# Patient Record
Sex: MALE | Race: WHITE | NOT HISPANIC OR LATINO | Employment: FULL TIME | URBAN - METROPOLITAN AREA
[De-identification: names, ages, dates, MRNs, and addresses within clinical notes are randomized per-mention and may not be internally consistent; named-entity substitution may affect disease eponyms.]

---

## 2020-02-06 ENCOUNTER — HOSPITAL ENCOUNTER (OUTPATIENT)
Dept: OTHER | Facility: HOSPITAL | Age: 45
Discharge: HOME | End: 2020-02-06
Attending: NURSE PRACTITIONER

## 2020-03-12 ENCOUNTER — HOSPITAL ENCOUNTER (OUTPATIENT)
Dept: OTHER | Facility: HOSPITAL | Age: 45
Discharge: HOME | End: 2020-03-12
Attending: NURSE PRACTITIONER

## 2022-10-27 ENCOUNTER — TRANSCRIBE ORDERS (OUTPATIENT)
Dept: ADMINISTRATIVE | Facility: HOSPITAL | Age: 47
End: 2022-10-27

## 2022-10-27 DIAGNOSIS — G44.001 INTRACTABLE CLUSTER HEADACHE SYNDROME, UNSPECIFIED CHRONICITY PATTERN: Primary | ICD-10-CM

## 2022-11-01 ENCOUNTER — HOSPITAL ENCOUNTER (OUTPATIENT)
Dept: CT IMAGING | Facility: HOSPITAL | Age: 47
Discharge: HOME | End: 2022-11-01
Admitting: NURSE PRACTITIONER

## 2022-11-01 DIAGNOSIS — G44.001 INTRACTABLE CLUSTER HEADACHE SYNDROME, UNSPECIFIED CHRONICITY PATTERN: ICD-10-CM

## 2022-11-01 PROCEDURE — 70450 CT HEAD/BRAIN W/O DYE: CPT

## 2023-02-10 ENCOUNTER — HOSPITAL ENCOUNTER (OUTPATIENT)
Dept: GENERAL RADIOLOGY | Facility: HOSPITAL | Age: 48
Discharge: HOME | End: 2023-02-10
Admitting: PHYSICIAN ASSISTANT
Payer: COMMERCIAL

## 2023-02-10 ENCOUNTER — OFFICE VISIT (OUTPATIENT)
Dept: ORTHOPEDIC SURGERY | Facility: CLINIC | Age: 48
End: 2023-02-10
Payer: COMMERCIAL

## 2023-02-10 VITALS — HEIGHT: 72 IN | BODY MASS INDEX: 28.53 KG/M2 | WEIGHT: 210.6 LBS

## 2023-02-10 DIAGNOSIS — G89.29 CHRONIC LEFT SHOULDER PAIN: Primary | ICD-10-CM

## 2023-02-10 DIAGNOSIS — M25.512 LEFT SHOULDER PAIN, UNSPECIFIED CHRONICITY: ICD-10-CM

## 2023-02-10 DIAGNOSIS — M25.512 LEFT SHOULDER PAIN, UNSPECIFIED CHRONICITY: Primary | ICD-10-CM

## 2023-02-10 DIAGNOSIS — M25.512 CHRONIC LEFT SHOULDER PAIN: Primary | ICD-10-CM

## 2023-02-10 PROCEDURE — 73030 X-RAY EXAM OF SHOULDER: CPT

## 2023-02-10 PROCEDURE — 99203 OFFICE O/P NEW LOW 30 MIN: CPT | Performed by: PHYSICIAN ASSISTANT

## 2023-02-10 RX ORDER — ASPIRIN 81 MG/1
TABLET ORAL
COMMUNITY

## 2023-02-10 NOTE — PROGRESS NOTES
"Chief Complaint  Establish Care of the Left Shoulder    Subjective    History of Present Illness      Santos Willis is a 47 y.o. male who presents to [facility] for complaint of Shoulder Pain: Patient complains of left shoulder pain.   The onset of the pain was about 2-3 ago.    The pain is described as aching and throbbing.    His symptoms were initially described as moderate Ezra painful.  He denies any injury to the area.  He actually reports he was placed on meloxicam recently for an unrelated issue and since the beginning of this week, his shoulder pain has resolved.    He has a known diabetic with a hemoglobin A1c of 6.4%.    Objective   Vital Signs:   Ht 182.9 cm (72\")   Wt 95.5 kg (210 lb 9.6 oz)   BMI 28.56 kg/m²     Physical Exam  Vitals and nursing note reviewed.   Constitutional:       Appearance: Normal appearance.   Pulmonary:      Effort: Pulmonary effort is normal.   Skin:     General: Skin is warm and dry.      Capillary Refill: Capillary refill takes less than 2 seconds.   Neurological:      General: No focal deficit present.      Mental Status: He is alert and oriented to person, place, and time. Mental status is at baseline.   Psychiatric:         Mood and Affect: Mood normal.         Behavior: Behavior normal.         Thought Content: Thought content normal.         Judgment: Judgment normal.         Ortho Exam   LEFT shoulder  Negative for pain and tenderness with palpation over the subcromial bursa.   Negative for signs of impingement with internal and external rotation.   Forward flexion is 0- 140 degrees, abduction is 0-140 degrees, external rotation is 0-50 degrees.   Rotator cuff function is well preserved.   Apprehension sign is negative.   Axillary nerve function is well preserved. Radial artery pulses are palpable.   There is no evidence of multidirectional instability.       Result Review :   Radiologic studies - see below for interpretation  LEFT " shoulder xrays   4 views were ordered by Wally Longoria PA-C. Performed at [facility] on 2/10/2023. Images were independently viewed and interpreted by myself, my impression as follows:  Findings: Mild AC joint arthrosis, glenohumeral joint appears well-maintained, joint spaces overall appear well-maintained  Bony lesion: no  Soft tissues: within normal limits  Joint spaces: within normal limits  Hardware appropriately positioned: not applicable  Prior studies available for comparison: no       PROCEDURE  Procedures           Assessment   Assessment and Plan    Problem List Items Addressed This Visit        Musculoskeletal and Injuries    Chronic left shoulder pain - Primary       Follow Up   · Discussion of any imaging in detail. Discussion of orthopaedic goals.  · Risk, benefits, and merits of treatment alternatives reviewed with the patient. Treatment alternatives include: Continue meloxicam as needed, will refill if he needs.  · Ice, heat, and/or modalities as beneficial  · Patient is encouraged to call or return for any issues or concerns.  · Follow up as needed  • Patient was given instructions and counseling regarding his condition or for health maintenance advice. Please see specific information pulled into the AVS if appropriate.     Wally Longoria PA-C    Date of Encounter: 2/10/2023   Electronically signed by Wally Longoria PA-C, 02/15/23, 12:34 PM EST.

## 2023-02-15 PROBLEM — M25.512 CHRONIC LEFT SHOULDER PAIN: Status: ACTIVE | Noted: 2023-02-15

## 2023-02-15 PROBLEM — G89.29 CHRONIC LEFT SHOULDER PAIN: Status: ACTIVE | Noted: 2023-02-15

## 2024-10-14 ENCOUNTER — OFFICE VISIT (OUTPATIENT)
Dept: UROLOGY | Facility: CLINIC | Age: 49
End: 2024-10-14
Payer: COMMERCIAL

## 2024-10-14 VITALS
WEIGHT: 224.4 LBS | DIASTOLIC BLOOD PRESSURE: 77 MMHG | BODY MASS INDEX: 30.4 KG/M2 | SYSTOLIC BLOOD PRESSURE: 125 MMHG | HEIGHT: 72 IN | RESPIRATION RATE: 12 BRPM | HEART RATE: 73 BPM

## 2024-10-14 DIAGNOSIS — R36.1 HEMATOSPERMIA: Primary | ICD-10-CM

## 2024-10-14 PROBLEM — R06.83 SNORING: Status: ACTIVE | Noted: 2021-10-27

## 2024-10-14 PROBLEM — E66.3 OVERWEIGHT: Status: ACTIVE | Noted: 2023-08-22

## 2024-10-14 PROBLEM — M54.6 THORACIC BACK PAIN: Status: ACTIVE | Noted: 2020-02-07

## 2024-10-14 PROBLEM — E11.9 TYPE 2 DIABETES MELLITUS: Chronic | Status: ACTIVE | Noted: 2022-02-06

## 2024-10-14 PROBLEM — S63.649A RUPTURE OF ULNAR COLLATERAL LIGAMENT OF THUMB: Status: ACTIVE | Noted: 2020-03-16

## 2024-10-14 PROBLEM — M51.34 DEGENERATION OF THORACIC INTERVERTEBRAL DISC: Status: ACTIVE | Noted: 2023-08-22

## 2024-10-14 LAB
BILIRUB BLD-MCNC: NEGATIVE MG/DL
CLARITY, POC: CLEAR
COLOR UR: YELLOW
EXPIRATION DATE: ABNORMAL
GLUCOSE UR STRIP-MCNC: ABNORMAL MG/DL
KETONES UR QL: ABNORMAL
LEUKOCYTE EST, POC: NEGATIVE
Lab: ABNORMAL
NITRITE UR-MCNC: POSITIVE MG/ML
PH UR: 5.5 [PH] (ref 5–8)
PROT UR STRIP-MCNC: NEGATIVE MG/DL
RBC # UR STRIP: NEGATIVE /UL
SP GR UR: 1.02 (ref 1–1.03)
UROBILINOGEN UR QL: NORMAL

## 2024-10-14 PROCEDURE — 81003 URINALYSIS AUTO W/O SCOPE: CPT | Performed by: NURSE PRACTITIONER

## 2024-10-14 PROCEDURE — 99203 OFFICE O/P NEW LOW 30 MIN: CPT | Performed by: NURSE PRACTITIONER

## 2024-10-14 RX ORDER — DOXYCYCLINE 100 MG/1
100 CAPSULE ORAL 2 TIMES DAILY
Qty: 56 CAPSULE | Refills: 0 | Status: SHIPPED | OUTPATIENT
Start: 2024-10-14 | End: 2024-11-11

## 2024-10-14 RX ORDER — L.ACIDOPH/B.ANIMALIS/B.LONGUM 15B CELL
1 CAPSULE ORAL DAILY
Qty: 30 CAPSULE | Refills: 0 | Status: SHIPPED | OUTPATIENT
Start: 2024-10-14

## 2024-10-14 NOTE — PROGRESS NOTES
Chief Complaint: Hematospermia (Pt here as a new pt.  Pt has seen blood in semen.)    Subjective         History of Present Illness  Snatos Willis is a 49 y.o. male presents to [facility] to be seen for hematospermia.    He states that he has had this happen on several occasions for the last 6-8 months.     He reports that this is intermittent.     He states different positions will cause more blood .     Pain and burning with blood in the urethra after ejaculation.     Denies perineal pain.     Denies urgency or frequency with urination.     He states issues with erections as well at times.     Hx of renal stones had eswol 10 years ago.             Objective     Past Medical History:   Diagnosis Date    Acid reflux     Diabetes mellitus     High cholesterol     Hypertension        Past Surgical History:   Procedure Laterality Date    CYST REMOVAL  1996    RIGHT BREAST    HEEL SPUR EXCISION Right 1994    HERNIA REPAIR  1992    KIDNEY STONE SURGERY           Current Outpatient Medications:     aspirin 81 MG EC tablet, Take  by mouth., Disp: , Rfl:     doxycycline (VIBRAMYCIN) 100 MG capsule, Take 1 capsule by mouth 2 (Two) Times a Day for 28 days., Disp: 56 capsule, Rfl: 0    Farxiga 10 MG tablet, Take 1 tablet by mouth Daily., Disp: 90 tablet, Rfl: 1    glipizide (GLUCOTROL XL) 5 MG ER tablet, Take 1 tablet by mouth Daily., Disp: 90 tablet, Rfl: 1    Probiotic Product (Florajen3) capsule, Take 1 capsule by mouth Daily., Disp: 30 capsule, Rfl: 0    lisinopril (PRINIVIL,ZESTRIL) 20 MG tablet, Take 1 tablet by mouth Daily., Disp: 90 tablet, Rfl: 1    metFORMIN ER (GLUCOPHAGE-XR) 500 MG 24 hr tablet, Take 2 tablets by mouth twice daily, Disp: 120 tablet, Rfl: 1    omeprazole (priLOSEC) 40 MG capsule, Take 1 capsule by mouth daily, Disp: 30 capsule, Rfl: 1    pravastatin (PRAVACHOL) 20 MG tablet, Take 1 tablet by mouth Every Night., Disp: 30 tablet, Rfl: 11    Allergies   Allergen Reactions  "   Penicillins Unknown - Low Severity and Other (See Comments)     Other reaction(s): Other (See Comments)  STATES REACTION UNKNOWN-WAS TOLD AS A CHILD  STATES REACTION UNKNOWN-WAS TOLD AS A CHILD  STATES REACTION UNKNOWN-WAS TOLD AS A CHILD  Other reaction(s): Other (See Comments)  STATES REACTION UNKNOWN-WAS TOLD AS A CHILD          Family History   Problem Relation Age of Onset    Heart disease Mother     Heart disease Father        Social History     Socioeconomic History    Marital status:    Tobacco Use    Smoking status: Never     Passive exposure: Never    Smokeless tobacco: Never   Vaping Use    Vaping status: Never Used   Substance and Sexual Activity    Alcohol use: Yes     Comment: weekends mostly    Drug use: Never    Sexual activity: Defer       Vital Signs:   /77 (BP Location: Left arm, Patient Position: Sitting)   Pulse 73   Resp 12   Ht 182.9 cm (72\")   Wt 102 kg (224 lb 6.4 oz)   BMI 30.43 kg/m²      Physical Exam     Result Review :   The following data was reviewed by: CRISTELA La on 10/14/2024:  Results for orders placed or performed in visit on 10/14/24   POC Urinalysis Dipstick, Automated    Collection Time: 10/14/24  2:31 PM    Specimen: Urine   Result Value Ref Range    Color Yellow Yellow, Straw, Dark Yellow, Myra    Clarity, UA Clear Clear    Specific Gravity  1.020 1.005 - 1.030    pH, Urine 5.5 5.0 - 8.0    Leukocytes Negative Negative    Nitrite, UA Positive (A) Negative    Protein, POC Negative Negative mg/dL    Glucose, UA >=1000 mg/dL (3+) (A) Negative mg/dL    Ketones, UA Trace (A) Negative    Urobilinogen, UA Normal Normal, 0.2 E.U./dL    Bilirubin Negative Negative    Blood, UA Negative Negative    Lot Number [id number]     Expiration Date 2,025/8             Procedures        Assessment and Plan    Diagnoses and all orders for this visit:    1. Hematospermia (Primary)  -     POC Urinalysis Dipstick, Automated  -     doxycycline (VIBRAMYCIN) 100 MG " capsule; Take 1 capsule by mouth 2 (Two) Times a Day for 28 days.  Dispense: 56 capsule; Refill: 0  -     Probiotic Product (Florajen3) capsule; Take 1 capsule by mouth Daily.  Dispense: 30 capsule; Refill: 0        Provided reassurance.    Discussed with patient that hematospermia is almost always benign and self-limiting.  No known etiology at this point.  Urine dip positive for infection today.  No change in partners or sexual behavior.  Given his urinary symptoms and urinalysis we will go ahead and proceed with treating him for possible prostatitis to see if this will help to alleviate hematospermia.  Patient to continue to monitor and should it be come progressively worse or more bothersome after treatment with antibiotics will consider further work-up via transrectal ultrasound and scrotal ultrasound         I spent 15 minutes caring for Santos on this date of service. This time includes time spent by me in the following activities:reviewing tests, obtaining and/or reviewing a separately obtained history, performing a medically appropriate examination and/or evaluation , counseling and educating the patient/family/caregiver, ordering medications, tests, or procedures, and documenting information in the medical record  Follow Up   Return in about 8 weeks (around 12/9/2024) for f/u hematospermia.  Patient was given instructions and counseling regarding his condition or for health maintenance advice. Please see specific information pulled into the AVS if appropriate.         This document has been electronically signed by CRISTELA La  October 14, 2024 14:43 EDT

## 2024-12-09 ENCOUNTER — OFFICE VISIT (OUTPATIENT)
Dept: UROLOGY | Facility: CLINIC | Age: 49
End: 2024-12-09
Payer: COMMERCIAL

## 2024-12-09 VITALS
BODY MASS INDEX: 29.8 KG/M2 | DIASTOLIC BLOOD PRESSURE: 79 MMHG | HEIGHT: 72 IN | SYSTOLIC BLOOD PRESSURE: 133 MMHG | HEART RATE: 69 BPM | WEIGHT: 220 LBS

## 2024-12-09 DIAGNOSIS — R36.1 HEMATOSPERMIA: Primary | ICD-10-CM

## 2024-12-09 PROBLEM — E78.5 HYPERLIPIDEMIA: Status: ACTIVE | Noted: 2022-02-06

## 2024-12-09 PROCEDURE — 99212 OFFICE O/P EST SF 10 MIN: CPT | Performed by: NURSE PRACTITIONER

## 2024-12-09 NOTE — PROGRESS NOTES
Chief Complaint: Follow-up (Patient unable to void for sample)    Subjective         History of Present Illness  Santos Willis is a 49 y.o. male presents to [facility] to be seen for hematospermia.    He states no further episodes but he has not had intercourse in some time.     He denies any urinary symptoms at this time.    In general he feels well today.    He is unable to void today however his last UA was within normal limits.    Previous:     He states that he has had this happen on several occasions for the last 6-8 months.     He reports that this is intermittent.     He states different positions will cause more blood .     Pain and burning with blood in the urethra after ejaculation.     Denies perineal pain.     Denies urgency or frequency with urination.     He states issues with erections as well at times.     Hx of renal stones had eswol 10 years ago.             Objective     Past Medical History:   Diagnosis Date    Acid reflux     Diabetes mellitus     High cholesterol     Hypertension        Past Surgical History:   Procedure Laterality Date    CYST REMOVAL  1996    RIGHT BREAST    HEEL SPUR EXCISION Right 1994    HERNIA REPAIR  1992    KIDNEY STONE SURGERY           Current Outpatient Medications:     aspirin 81 MG EC tablet, Take  by mouth., Disp: , Rfl:     Farxiga 10 MG tablet, Take 1 tablet by mouth Daily., Disp: 90 tablet, Rfl: 1    glipizide (GLUCOTROL XL) 5 MG ER tablet, Take 1 tablet by mouth Daily., Disp: 90 tablet, Rfl: 1    glipizide (GLUCOTROL XL) 5 MG ER tablet, Take 1 tablet by mouth Daily., Disp: 90 tablet, Rfl: 1    lisinopril (PRINIVIL,ZESTRIL) 20 MG tablet, Take 1 tablet by mouth Daily., Disp: 90 tablet, Rfl: 1    lisinopril (PRINIVIL,ZESTRIL) 20 MG tablet, Take 1 tablet by mouth Daily., Disp: 90 tablet, Rfl: 1    metFORMIN ER (GLUCOPHAGE-XR) 500 MG 24 hr tablet, Take 2 tablets by mouth twice daily, Disp: 120 tablet, Rfl: 1    metFORMIN ER  "(GLUCOPHAGE-XR) 500 MG 24 hr tablet, Take 2 tablets by mouth 2 (Two) Times a Day., Disp: 360 tablet, Rfl: 1    omeprazole (priLOSEC) 40 MG capsule, Take 1 capsule by mouth daily, Disp: 30 capsule, Rfl: 1    omeprazole (priLOSEC) 40 MG capsule, Take 1 capsule by mouth Daily., Disp: 90 capsule, Rfl: 3    pravastatin (PRAVACHOL) 20 MG tablet, Take 1 tablet by mouth Every Night., Disp: 30 tablet, Rfl: 11    Probiotic Product (Florajen3) capsule, Take 1 capsule by mouth Daily., Disp: 30 capsule, Rfl: 0    Allergies   Allergen Reactions    Penicillins Unknown - Low Severity and Other (See Comments)     Other reaction(s): Other (See Comments)  STATES REACTION UNKNOWN-WAS TOLD AS A CHILD  STATES REACTION UNKNOWN-WAS TOLD AS A CHILD  STATES REACTION UNKNOWN-WAS TOLD AS A CHILD  Other reaction(s): Other (See Comments)  STATES REACTION UNKNOWN-WAS TOLD AS A CHILD          Family History   Problem Relation Age of Onset    Heart disease Mother     Heart disease Father        Social History     Socioeconomic History    Marital status:    Tobacco Use    Smoking status: Never     Passive exposure: Never    Smokeless tobacco: Never   Vaping Use    Vaping status: Never Used   Substance and Sexual Activity    Alcohol use: Yes     Comment: weekends mostly    Drug use: Never    Sexual activity: Defer       Vital Signs:   /79 (BP Location: Right arm, Patient Position: Sitting, Cuff Size: Large Adult)   Pulse 69   Ht 182.9 cm (72\")   Wt 99.8 kg (220 lb)   BMI 29.84 kg/m²      Physical Exam     Result Review :   The following data was reviewed by: CRISTELA La on 12/09/2024:  Results for orders placed or performed in visit on 10/14/24   POC Urinalysis Dipstick, Automated    Collection Time: 10/14/24  2:31 PM    Specimen: Urine   Result Value Ref Range    Color Yellow Yellow, Straw, Dark Yellow, Myra    Clarity, UA Clear Clear    Specific Gravity  1.020 1.005 - 1.030    pH, Urine 5.5 5.0 - 8.0    Leukocytes " Negative Negative    Nitrite, UA Positive (A) Negative    Protein, POC Negative Negative mg/dL    Glucose, UA >=1000 mg/dL (3+) (A) Negative mg/dL    Ketones, UA Trace (A) Negative    Urobilinogen, UA Normal Normal, 0.2 E.U./dL    Bilirubin Negative Negative    Blood, UA Negative Negative    Lot Number [id number]     Expiration Date 2,025/8             Procedures        Assessment and Plan    Diagnoses and all orders for this visit:    1. Hematospermia (Primary)  -     Cancel: POC Urinalysis Dipstick, Automated      Reinforced reassurance  Discussed with patient that hematospermia is almost always benign and self-limiting.  No known etiology at this point.  Urine dip negative for infection today.  No change in partners or sexual behavior.  Anticipate spontaneous resolution; no intervention is required.  Patient to continue to monitor and should it be come progressively worse or more bothersome in about 6 months, patient to arrange follow-up and will consider further work-up via transrectal ultrasound and scrotal ultrasound     Patient to follow-up as needed  All questions addressed         I spent 10 minutes caring for Santos on this date of service. This time includes time spent by me in the following activities:reviewing tests, obtaining and/or reviewing a separately obtained history, performing a medically appropriate examination and/or evaluation , counseling and educating the patient/family/caregiver, ordering medications, tests, or procedures, and documenting information in the medical record  Follow Up   Return if symptoms worsen or fail to improve.  Patient was given instructions and counseling regarding his condition or for health maintenance advice. Please see specific information pulled into the AVS if appropriate.         This document has been electronically signed by CRISTELA La  December 9, 2024 14:27 EST